# Patient Record
Sex: MALE | Race: WHITE | NOT HISPANIC OR LATINO | Employment: FULL TIME | URBAN - METROPOLITAN AREA
[De-identification: names, ages, dates, MRNs, and addresses within clinical notes are randomized per-mention and may not be internally consistent; named-entity substitution may affect disease eponyms.]

---

## 2019-01-07 ENCOUNTER — TELEPHONE (OUTPATIENT)
Dept: INTERNAL MEDICINE | Facility: CLINIC | Age: 54
End: 2019-01-07

## 2019-01-08 PROBLEM — M16.0 ARTHRITIS OF BOTH HIPS: Status: ACTIVE | Noted: 2018-01-22

## 2019-01-09 ENCOUNTER — OFFICE VISIT (OUTPATIENT)
Dept: INTERNAL MEDICINE | Facility: CLINIC | Age: 54
End: 2019-01-09

## 2019-01-09 VITALS
HEIGHT: 74 IN | HEART RATE: 68 BPM | DIASTOLIC BLOOD PRESSURE: 80 MMHG | SYSTOLIC BLOOD PRESSURE: 114 MMHG | WEIGHT: 205 LBS | BODY MASS INDEX: 26.31 KG/M2 | TEMPERATURE: 98.3 F

## 2019-01-09 DIAGNOSIS — J01.00 ACUTE NON-RECURRENT MAXILLARY SINUSITIS: Primary | ICD-10-CM

## 2019-01-09 PROCEDURE — 99213 OFFICE O/P EST LOW 20 MIN: CPT | Performed by: PHYSICIAN ASSISTANT

## 2019-01-09 RX ORDER — CLARITHROMYCIN 250 MG/1
500 TABLET, FILM COATED ORAL 2 TIMES DAILY
Qty: 20 TABLET | Refills: 0 | Status: SHIPPED | OUTPATIENT
Start: 2019-01-09 | End: 2019-01-23

## 2019-01-09 NOTE — PROGRESS NOTES
Patient Care Team:  Prashant Dixon MD as PCP - General (Internal Medicine)    Chief Complaint;:   Chief Complaint   Patient presents with   • Headache     symptoms for about 5 days   • Nasal Congestion        Subjective     HPI  6 day history of sinus pressure, congestion, headache, thick green drainage, slight mild nonproductive cough.   No fever or chills.  He has hx of allergies and frequent sinus infections in the past.   He is nonsmoker  Past Medical History:   Diagnosis Date   • Allergic rhinitis    • Arthritis    • Headache    • Insomnia        Social History     Socioeconomic History   • Marital status:      Spouse name: Not on file   • Number of children: Not on file   • Years of education: Not on file   • Highest education level: Not on file   Social Needs   • Financial resource strain: Not on file   • Food insecurity - worry: Not on file   • Food insecurity - inability: Not on file   • Transportation needs - medical: Not on file   • Transportation needs - non-medical: Not on file   Occupational History   • Not on file   Tobacco Use   • Smoking status: Never Smoker   • Smokeless tobacco: Never Used   Substance and Sexual Activity   • Alcohol use: Yes     Comment: weekly acohol use   • Drug use: Defer   • Sexual activity: Defer   Other Topics Concern   • Not on file   Social History Narrative   • Not on file       No Known Allergies    Review of Systems:     Review of Systems   Constitutional: Negative for chills, diaphoresis and fever.   HENT: Positive for ear pain, postnasal drip, rhinorrhea, sinus pressure and sore throat. Negative for congestion, ear discharge, hearing loss, mouth sores, nosebleeds, tinnitus and trouble swallowing.    Eyes: Negative for pain and visual disturbance.   Respiratory: Positive for cough. Negative for shortness of breath and wheezing.    Cardiovascular: Negative for chest pain, palpitations and leg swelling.   Gastrointestinal: Negative for abdominal pain, blood in  "stool, constipation, diarrhea, nausea, vomiting and GERD.   Genitourinary: Negative for breast discharge, dysuria, frequency and hematuria.   Musculoskeletal: Negative for arthralgias, back pain, joint swelling, myalgias, neck pain, neck stiffness and bursitis.   Skin: Negative for rash and skin lesions.   Allergic/Immunologic: Negative for environmental allergies.   Neurological: Negative for dizziness, seizures, syncope, speech difficulty, weakness, headache and memory problem.   Psychiatric/Behavioral: Negative for sleep disturbance and depressed mood. The patient is not nervous/anxious.        Vital Signs  Vitals:    01/09/19 0959   BP: 114/80   BP Location: Left arm   Patient Position: Sitting   Cuff Size: Adult   Pulse: 68   Temp: 98.3 °F (36.8 °C)   TempSrc: Temporal   Weight: 93 kg (205 lb)   Height: 188 cm (74\")   PainSc: 0-No pain         Current Outpatient Medications:   •  clarithromycin (BIAXIN) 250 MG tablet, Take 2 tablets by mouth 2 (Two) Times a Day., Disp: 20 tablet, Rfl: 0    Physical Exam:    Physical Exam   Constitutional: He is oriented to person, place, and time. He appears well-developed and well-nourished.   HENT:   Green post nasal drainage   Neck: Normal range of motion. Neck supple.   Cardiovascular: Normal rate, regular rhythm and normal heart sounds.   Pulmonary/Chest: Effort normal and breath sounds normal.   Lymphadenopathy:     He has cervical adenopathy.   Neurological: He is alert and oriented to person, place, and time.   Nursing note and vitals reviewed.        Assessment/Plan   Julio was seen today for headache and nasal congestion.    Diagnoses and all orders for this visit:    Acute non-recurrent maxillary sinusitis    Other orders  -     clarithromycin (BIAXIN) 250 MG tablet; Take 2 tablets by mouth 2 (Two) Times a Day.      Patient Instructions   Biaxin twice a day.  Call if not improving next week.      Plan of care reviewed with patient at the conclusion of today's visit. " Education was provided regarding diagnosis, management, and any prescribed or recommended OTC medications.Patient verbalizes understanding of and agreement with management plan.     Maggie Capone PA-C

## 2019-01-09 NOTE — PROGRESS NOTES
Patient Care Team:  Prashant Dixon MD as PCP - General (Internal Medicine)    Chief Complaint;:   Chief Complaint   Patient presents with   • Headache     symptoms for about 5 days   • Nasal Congestion        Subjective     HPI    Past Medical History:   Diagnosis Date   • Allergic rhinitis    • Arthritis    • Headache    • Insomnia        Social History     Socioeconomic History   • Marital status:      Spouse name: Not on file   • Number of children: Not on file   • Years of education: Not on file   • Highest education level: Not on file   Social Needs   • Financial resource strain: Not on file   • Food insecurity - worry: Not on file   • Food insecurity - inability: Not on file   • Transportation needs - medical: Not on file   • Transportation needs - non-medical: Not on file   Occupational History   • Not on file   Tobacco Use   • Smoking status: Never Smoker   • Smokeless tobacco: Never Used   Substance and Sexual Activity   • Alcohol use: Yes     Comment: weekly acohol use   • Drug use: Defer   • Sexual activity: Defer   Other Topics Concern   • Not on file   Social History Narrative   • Not on file       No Known Allergies    Review of Systems:     Review of Systems   Constitutional: Positive for fatigue. Negative for chills and fever.   HENT: Positive for congestion and sinus pressure. Negative for ear pain.    Respiratory: Positive for cough. Negative for chest tightness, shortness of breath and wheezing.    Cardiovascular: Negative for chest pain and palpitations.   Gastrointestinal: Negative for abdominal pain, blood in stool and constipation.   Skin: Negative for color change.   Allergic/Immunologic: Negative for environmental allergies.   Neurological: Negative for dizziness, speech difficulty and headache.   Psychiatric/Behavioral: Negative for decreased concentration. The patient is not nervous/anxious.        Vital Signs  Vitals:    01/09/19 0959   BP: 114/80   BP Location: Left arm   Patient  "Position: Sitting   Cuff Size: Adult   Pulse: 68   Temp: 98.3 °F (36.8 °C)   TempSrc: Temporal   Weight: 93 kg (205 lb)   Height: 188 cm (74\")   PainSc: 0-No pain       No current outpatient medications on file.    Physical Exam:    Physical Exam      Assessment/Plan   There are no diagnoses linked to this encounter.  There are no Patient Instructions on file for this visit.    Plan of care reviewed with patient at the conclusion of today's visit. Education was provided regarding diagnosis, management, and any prescribed or recommended OTC medications.Patient verbalizes understanding of and agreement with management plan.     Joanna Meyer MA  "

## 2019-01-18 ENCOUNTER — OFFICE VISIT (OUTPATIENT)
Dept: INTERNAL MEDICINE | Facility: CLINIC | Age: 54
End: 2019-01-18

## 2019-01-18 VITALS
TEMPERATURE: 98.2 F | BODY MASS INDEX: 25.67 KG/M2 | WEIGHT: 200 LBS | HEIGHT: 74 IN | HEART RATE: 60 BPM | SYSTOLIC BLOOD PRESSURE: 110 MMHG | DIASTOLIC BLOOD PRESSURE: 70 MMHG

## 2019-01-18 DIAGNOSIS — J01.00 ACUTE NON-RECURRENT MAXILLARY SINUSITIS: Primary | ICD-10-CM

## 2019-01-18 PROCEDURE — 99213 OFFICE O/P EST LOW 20 MIN: CPT | Performed by: PHYSICIAN ASSISTANT

## 2019-01-18 RX ORDER — FLUTICASONE PROPIONATE 50 MCG
1 SPRAY, SUSPENSION (ML) NASAL DAILY
COMMUNITY
Start: 2016-02-22 | End: 2020-03-12 | Stop reason: SDUPTHER

## 2019-01-18 RX ORDER — FEXOFENADINE HCL 180 MG/1
180 TABLET ORAL 2 TIMES DAILY
COMMUNITY
End: 2020-03-12 | Stop reason: SDUPTHER

## 2019-01-18 RX ORDER — TADALAFIL 5 MG/1
TABLET ORAL AS NEEDED
COMMUNITY
Start: 2012-02-09

## 2019-01-18 RX ORDER — CEFDINIR 300 MG/1
300 CAPSULE ORAL 2 TIMES DAILY
Qty: 20 CAPSULE | Refills: 0 | Status: SHIPPED | OUTPATIENT
Start: 2019-01-18 | End: 2019-09-10

## 2019-01-18 RX ORDER — LORATADINE 10 MG/1
10 TABLET ORAL DAILY
COMMUNITY
End: 2023-03-06

## 2019-01-18 RX ORDER — CETIRIZINE HYDROCHLORIDE 10 MG/1
10 TABLET ORAL DAILY
COMMUNITY
End: 2020-03-12 | Stop reason: SDUPTHER

## 2019-01-18 NOTE — PROGRESS NOTES
"Patient Care Team:  Prashant Dixon MD as PCP - General (Internal Medicine)    Chief Complaint;:   Chief Complaint   Patient presents with   • Follow-up     sinus infection not better        Subjective     HPI  53-year-old white male presents to the office today complaining of persistent sinus pain.  He completed the Biaxin with only minimal improvement.  He has had increased fatigue.  Slight cough.  Past Medical History:   Diagnosis Date   • Allergic rhinitis    • Arthritis    • Headache    • Insomnia        Social History     Socioeconomic History   • Marital status:      Spouse name: Not on file   • Number of children: Not on file   • Years of education: Not on file   • Highest education level: Not on file   Social Needs   • Financial resource strain: Not on file   • Food insecurity - worry: Not on file   • Food insecurity - inability: Not on file   • Transportation needs - medical: Not on file   • Transportation needs - non-medical: Not on file   Occupational History   • Not on file   Tobacco Use   • Smoking status: Never Smoker   • Smokeless tobacco: Never Used   Substance and Sexual Activity   • Alcohol use: Yes     Comment: weekly    • Drug use: No   • Sexual activity: Defer   Other Topics Concern   • Not on file   Social History Narrative   • Not on file       No Known Allergies    Review of Systems:     Review of Systems    Vital Signs  Vitals:    01/18/19 1617   BP: 110/70   BP Location: Left arm   Patient Position: Sitting   Cuff Size: Adult   Pulse: 60   Temp: 98.2 °F (36.8 °C)   TempSrc: Temporal   Weight: 90.7 kg (200 lb)   Height: 188 cm (74.02\")   PainSc:   6   PainLoc: Head  Comment: intermittent         Current Outpatient Medications:   •  cetirizine (zyrTEC) 10 MG tablet, Take 10 mg by mouth Daily., Disp: , Rfl:   •  fexofenadine (ALLEGRA) 180 MG tablet, Take 180 mg by mouth 2 (Two) Times a Day., Disp: , Rfl:   •  fluticasone (FLONASE) 50 MCG/ACT nasal spray, 1 spray into the nostril(s) as " directed by provider Daily. Each nostril, Disp: , Rfl:   •  loratadine (CLARITIN) 10 MG tablet, Take 10 mg by mouth Daily., Disp: , Rfl:   •  tadalafil (CIALIS) 5 MG tablet, Take  by mouth As Needed., Disp: , Rfl:   •  cefdinir (OMNICEF) 300 MG capsule, Take 1 capsule by mouth 2 (Two) Times a Day., Disp: 20 capsule, Rfl: 0  •  clarithromycin (BIAXIN) 250 MG tablet, Take 2 tablets by mouth 2 (Two) Times a Day., Disp: 20 tablet, Rfl: 0    Physical Exam:    Physical Exam    Procedures      Assessment/Plan   Problem List Items Addressed This Visit     None      Visit Diagnoses     Acute non-recurrent maxillary sinusitis    -  Primary    Cefdinir twice a day.    Relevant Medications    cefdinir (OMNICEF) 300 MG capsule        Patient Instructions   Cefdinir twice a day.   Continue sinus rinse.   Use Mucinex twice a day.      Plan of care reviewed with patient at the conclusion of today's visit. Education was provided regarding diagnosis, management, and any prescribed or recommended OTC medications.Patient verbalizes understanding of and agreement with management plan.     Maggie Capone PA-C

## 2019-01-18 NOTE — PROGRESS NOTES
Patient Care Team:  Prashant Dixon MD as PCP - General (Internal Medicine)    Chief Complaint;:   Chief Complaint   Patient presents with   • Follow-up     sinus infection not better        Subjective     HPI    Past Medical History:   Diagnosis Date   • Allergic rhinitis    • Arthritis    • Headache    • Insomnia        Past Surgical History:   Procedure Laterality Date   • KNEE ARTHROSCOPY Right    • ROTATOR CUFF REPAIR Left 2002       Family History   Problem Relation Age of Onset   • Alzheimer's disease Father    • Cancer Other    • Rheum arthritis Other    • Hypertension Other    • Osteoarthritis Other        Social History     Socioeconomic History   • Marital status:      Spouse name: Not on file   • Number of children: Not on file   • Years of education: Not on file   • Highest education level: Not on file   Social Needs   • Financial resource strain: Not on file   • Food insecurity - worry: Not on file   • Food insecurity - inability: Not on file   • Transportation needs - medical: Not on file   • Transportation needs - non-medical: Not on file   Occupational History   • Not on file   Tobacco Use   • Smoking status: Never Smoker   • Smokeless tobacco: Never Used   Substance and Sexual Activity   • Alcohol use: Yes     Comment: weekly    • Drug use: No   • Sexual activity: Defer   Other Topics Concern   • Not on file   Social History Narrative   • Not on file       No Known Allergies    Review of Systems:     Review of Systems   Constitutional: Negative for chills, fatigue and fever.   HENT: Positive for congestion and sinus pressure. Negative for ear pain.    Respiratory: Negative for cough, chest tightness, shortness of breath and wheezing.    Cardiovascular: Negative for chest pain and palpitations.   Gastrointestinal: Negative for abdominal pain, blood in stool and constipation.   Skin: Negative for color change.   Allergic/Immunologic: Negative for environmental allergies.   Neurological:  "Positive for headache. Negative for dizziness and speech difficulty.   Psychiatric/Behavioral: Negative for decreased concentration. The patient is not nervous/anxious.        Vital Signs  Vitals:    01/18/19 1617   BP: 110/70   BP Location: Left arm   Patient Position: Sitting   Cuff Size: Adult   Pulse: 60   Temp: 98.2 °F (36.8 °C)   TempSrc: Temporal   Weight: 90.7 kg (200 lb)   Height: 188 cm (74.02\")   PainSc:   6   PainLoc: Head  Comment: intermittent         Current Outpatient Medications:   •  cetirizine (zyrTEC) 10 MG tablet, Take 10 mg by mouth Daily., Disp: , Rfl:   •  fexofenadine (ALLEGRA) 180 MG tablet, Take 180 mg by mouth 2 (Two) Times a Day., Disp: , Rfl:   •  fluticasone (FLONASE) 50 MCG/ACT nasal spray, 1 spray into the nostril(s) as directed by provider Daily. Each nostril, Disp: , Rfl:   •  loratadine (CLARITIN) 10 MG tablet, Take 10 mg by mouth Daily., Disp: , Rfl:   •  tadalafil (CIALIS) 5 MG tablet, Take  by mouth As Needed., Disp: , Rfl:   •  clarithromycin (BIAXIN) 250 MG tablet, Take 2 tablets by mouth 2 (Two) Times a Day., Disp: 20 tablet, Rfl: 0    Physical Exam:    Physical Exam    Procedures     Results Review:    I reviewed the patient's new clinical results.    Assessment/Plan   Problem List Items Addressed This Visit     None        There are no Patient Instructions on file for this visit.    Plan of care reviewed with patient at the conclusion of today's visit. Education was provided regarding diagnosis, management, and any prescribed or recommended OTC medications.Patient verbalizes understanding of and agreement with management plan.     Maggie Monae MA  "

## 2019-01-18 NOTE — PROGRESS NOTES
"Patient Care Team:  Prashant Dixon MD as PCP - General (Internal Medicine)    Chief Complaint;:   Chief Complaint   Patient presents with   • Follow-up     sinus infection not better        Subjective     HPI    Past Medical History:   Diagnosis Date   • Allergic rhinitis    • Arthritis    • Headache    • Insomnia        Social History     Socioeconomic History   • Marital status:      Spouse name: Not on file   • Number of children: Not on file   • Years of education: Not on file   • Highest education level: Not on file   Social Needs   • Financial resource strain: Not on file   • Food insecurity - worry: Not on file   • Food insecurity - inability: Not on file   • Transportation needs - medical: Not on file   • Transportation needs - non-medical: Not on file   Occupational History   • Not on file   Tobacco Use   • Smoking status: Never Smoker   • Smokeless tobacco: Never Used   Substance and Sexual Activity   • Alcohol use: Yes     Comment: weekly    • Drug use: No   • Sexual activity: Defer   Other Topics Concern   • Not on file   Social History Narrative   • Not on file       No Known Allergies    Review of Systems:     Review of Systems    Vital Signs  Vitals:    01/18/19 1617   BP: 110/70   BP Location: Left arm   Patient Position: Sitting   Cuff Size: Adult   Pulse: 60   Temp: 98.2 °F (36.8 °C)   TempSrc: Temporal   Weight: 90.7 kg (200 lb)   Height: 188 cm (74.02\")   PainSc:   6   PainLoc: Head  Comment: intermittent         Current Outpatient Medications:   •  cetirizine (zyrTEC) 10 MG tablet, Take 10 mg by mouth Daily., Disp: , Rfl:   •  fexofenadine (ALLEGRA) 180 MG tablet, Take 180 mg by mouth 2 (Two) Times a Day., Disp: , Rfl:   •  fluticasone (FLONASE) 50 MCG/ACT nasal spray, 1 spray into the nostril(s) as directed by provider Daily. Each nostril, Disp: , Rfl:   •  loratadine (CLARITIN) 10 MG tablet, Take 10 mg by mouth Daily., Disp: , Rfl:   •  tadalafil (CIALIS) 5 MG tablet, Take  by mouth As " Needed., Disp: , Rfl:   •  cefdinir (OMNICEF) 300 MG capsule, Take 1 capsule by mouth 2 (Two) Times a Day., Disp: 20 capsule, Rfl: 0  •  clarithromycin (BIAXIN) 250 MG tablet, Take 2 tablets by mouth 2 (Two) Times a Day., Disp: 20 tablet, Rfl: 0    Physical Exam:    Physical Exam    Procedures      Assessment/Plan   Problem List Items Addressed This Visit     None      Visit Diagnoses     Acute non-recurrent maxillary sinusitis    -  Primary    Relevant Medications    cefdinir (OMNICEF) 300 MG capsule        Patient Instructions   Cefdinir twice a day.   Continue sinus rinse.   Use Mucinex twice a day.      Plan of care reviewed with patient at the conclusion of today's visit. Education was provided regarding diagnosis, management, and any prescribed or recommended OTC medications.Patient verbalizes understanding of and agreement with management plan.     Maggie Capone PA-C

## 2019-01-23 ENCOUNTER — OFFICE VISIT (OUTPATIENT)
Dept: INTERNAL MEDICINE | Facility: CLINIC | Age: 54
End: 2019-01-23

## 2019-01-23 VITALS
BODY MASS INDEX: 26.51 KG/M2 | SYSTOLIC BLOOD PRESSURE: 110 MMHG | WEIGHT: 200 LBS | TEMPERATURE: 97.9 F | HEIGHT: 73 IN | DIASTOLIC BLOOD PRESSURE: 72 MMHG | HEART RATE: 64 BPM

## 2019-01-23 DIAGNOSIS — J30.89 NON-SEASONAL ALLERGIC RHINITIS DUE TO OTHER ALLERGIC TRIGGER: Primary | ICD-10-CM

## 2019-01-23 DIAGNOSIS — M16.0 ARTHRITIS OF BOTH HIPS: ICD-10-CM

## 2019-01-23 DIAGNOSIS — R51.9 NONINTRACTABLE EPISODIC HEADACHE, UNSPECIFIED HEADACHE TYPE: ICD-10-CM

## 2019-01-23 DIAGNOSIS — F51.01 PRIMARY INSOMNIA: ICD-10-CM

## 2019-01-23 PROCEDURE — 99396 PREV VISIT EST AGE 40-64: CPT | Performed by: INTERNAL MEDICINE

## 2019-01-23 NOTE — PATIENT INSTRUCTIONS
Patient needs to return for fasting lab work  Patient needs to obtain any history from Brother with a reportedly elevated cardiac calcium score  Is to continue all medications including his antihistamines

## 2019-01-23 NOTE — PROGRESS NOTES
[facility]     Julio Pérez  1965   [id number]      Patient Care Team:  Prashant Dixon MD as PCP - General (Internal Medicine)    Chief Complaint::   Chief Complaint   Patient presents with   • Annual Exam     not fasting             HPI  The patient is a 53-year-old male presents for annual preventative visit and physical examination he overall feels well he has a recent sinusitis and congestion infection that is improved on antibiotic he was seen on December 18 with therapy of Omnicef he is improved.  The patient has general allergies has seen allergist and is on Zyrtec Allegra Flonase and Claritin and is improved and fairly well controlled    Chronic Conditions:  Chronic conditions are reviewed primarily allergy insomnia sinus headache and mild arthritis of both hips all currently stable.    Patient Active Problem List   Diagnosis   • Arthritis of both hips   • Headache   • Allergic rhinitis due to allergen   • Insomnia        Past Medical History:   Diagnosis Date   • Allergic rhinitis    • Arthritis    • Headache    • Insomnia        Past Surgical History:   Procedure Laterality Date   • KNEE ARTHROSCOPY Right    • ROTATOR CUFF REPAIR Left 2002       Family History   Problem Relation Age of Onset   • Alzheimer's disease Father    • Cancer Other    • Rheum arthritis Other    • Hypertension Other    • Osteoarthritis Other        Social History     Socioeconomic History   • Marital status:      Spouse name: Not on file   • Number of children: Not on file   • Years of education: Not on file   • Highest education level: Not on file   Social Needs   • Financial resource strain: Not on file   • Food insecurity - worry: Not on file   • Food insecurity - inability: Not on file   • Transportation needs - medical: Not on file   • Transportation needs - non-medical: Not on file   Occupational History   • Not on file   Tobacco Use   • Smoking status: Never Smoker   • Smokeless tobacco: Never Used  "  Substance and Sexual Activity   • Alcohol use: Yes     Comment: weekly    • Drug use: No   • Sexual activity: Defer   Other Topics Concern   • Not on file   Social History Narrative   • Not on file       No Known Allergies    Immunization History   Administered Date(s) Administered   • Tdap 09/16/2013        Health Maintenance Due   Topic Date Due   • ZOSTER VACCINE (1 of 2) 11/25/2017   • ANNUAL PHYSICAL  01/23/2019        Review of Systems     HEENT: Current resolving maxillary sinusitis with some headache denies ear problems no nosebleeds some  pharyngeal discomfort  NECK:  Denies pain stiffness or swelling or dysphagia  CHEST: Denies cough or wheeze shortness of breath rib chest wall injury  CARDIAC: Denies chest pain pressure tightness palpitations  ABD: Denies nausea vomiting recent colonoscopy-stable with repeat in 5 years colonoscopy  By EDILIA Barnes  : Denies dysuria or frequency  NEURO:  Denies syncope or paresthesias  PSYCH:  Normal  EXTREM: Some bilateral hip soreness has seen Dr. Lezama in the past NSAID therapy is beneficial.    Vital Signs  Vitals:    01/23/19 0853   BP: 110/72   BP Location: Left arm   Patient Position: Sitting   Cuff Size: Adult   Pulse: 64   Temp: 97.9 °F (36.6 °C)   TempSrc: Temporal   Weight: 90.7 kg (200 lb)   Height: 185 cm (72.84\")   PainSc: 0-No pain         Current Outpatient Medications:   •  cefdinir (OMNICEF) 300 MG capsule, Take 1 capsule by mouth 2 (Two) Times a Day., Disp: 20 capsule, Rfl: 0  •  cetirizine (zyrTEC) 10 MG tablet, Take 10 mg by mouth Daily., Disp: , Rfl:   •  fexofenadine (ALLEGRA) 180 MG tablet, Take 180 mg by mouth 2 (Two) Times a Day., Disp: , Rfl:   •  fluticasone (FLONASE) 50 MCG/ACT nasal spray, 1 spray into the nostril(s) as directed by provider Daily. Each nostril, Disp: , Rfl:   •  loratadine (CLARITIN) 10 MG tablet, Take 10 mg by mouth Daily., Disp: , Rfl:   •  tadalafil (CIALIS) 5 MG tablet, Take  by mouth As Needed., Disp: , Rfl: "     Physical Exam:  HEENT: Pupils equal reactive ENT clear no facial asymmetry pharynx is clear  NECK:  Without masses thyromegaly or bruit  Distention  CHEST: Clear to P&A without rales wheezes or rhonchi  CARDIAC: Regular rhythm without gallop rub click or murmur  ABD: Abdomen is normal  : Deferred  NEURO:  Intact  PSYCH:  Normal  EXTREM: Normal with minimal arthritis of both hips  Skin: Clear.  Warm and dry excellent peripheral pulses no evidence of neuropathy     Results Review:    Patient is not fasting this day will return for fasting CBC CMP lipid TSH PSA  Procedures     Patient Wellness Counseling:   Plan of care reviewed with patient at the conclusion of today's visit. Education was provided in regards to diagnosis, diet and exercise, prostate cancer screening discussed including benefit of early detection, potential need for follow-up, and harms associated with additional management. Patient agrees to screening.    Nutrition, physical activity, healthy weight,ways to reduce stress, adequate sleep, injury prevention, misuse of tobacco, alcohol and drugs, sexual behavior and STD's, dental health, mental health, and immunizations.    Plan of care reviewed with patient at the conclusion of today's visit. Education was provided regarding diagnosis, management and any prescribed or recommended OTC medications.  Patient verbalizes understanding of and agreement with management plan.      Medication Review: Medications reviewed and noted    Patient wellness counseling  Exercise: Patient is very active and exercises on a daily basis encouraged to continue same  Diet: Patient attempts healthy diet  Smoking: Nonsmoker  Alcohol: Evening alcohol no evidence of misuse  Screening: Preventative screening noted including colonoscopy completed September 2017    Assessment/Plan:  #1 chronic allergy-severe followed by allergy with control with Zyrtec Allegra Flonase and Claritin  #2 is acute x-ray sinusitis-improved  #3  osteoarthritis of both hips with arthro scopic surgery of right knee and left shoulder-stable  #4 insomnia  #5 family history of elevated cardiac calcium score, patient does not know full details Brother lives in [place] he will obtain the full details the patient is currently asymptomatic for any cardiovascular disease but await details of the family history for proceeding with any variety of CV evaluation pending patient's lab family history.    Patient Instructions   Patient needs to return for fasting lab work  Patient needs to obtain any history from Brother with a reportedly elevated cardiac calcium score  Is to continue all medications including his antihistamines         CMP:     HbA1c:  No results found for: HGBA1C  Microalbumin:  No results found for: MICROALBUR, POCMALB, POCCREAT  Lipid Panel  No results found for: CHOL, TRIG, HDL, LDL, AST, ALT     Counseling was given to  for the following topics: appropriate exercise .    Plan of care reviewed with patient at the conclusion of today's visit. Education was provided regarding diagnosis, management, and any prescribed or recommended OTC medications.Patient verbalizes understanding of and agreement with management plan.         Prashant Dixon MD

## 2019-02-05 RX ORDER — OSELTAMIVIR PHOSPHATE 75 MG/1
75 CAPSULE ORAL DAILY
Qty: 10 CAPSULE | Refills: 0 | Status: SHIPPED | OUTPATIENT
Start: 2019-02-05 | End: 2019-09-10

## 2019-02-21 ENCOUNTER — LAB (OUTPATIENT)
Dept: LAB | Facility: HOSPITAL | Age: 54
End: 2019-02-21

## 2019-02-21 DIAGNOSIS — R51.9 NONINTRACTABLE EPISODIC HEADACHE, UNSPECIFIED HEADACHE TYPE: ICD-10-CM

## 2019-02-21 DIAGNOSIS — M16.0 ARTHRITIS OF BOTH HIPS: ICD-10-CM

## 2019-02-21 DIAGNOSIS — J30.89 NON-SEASONAL ALLERGIC RHINITIS DUE TO OTHER ALLERGIC TRIGGER: ICD-10-CM

## 2019-02-21 LAB
ALBUMIN SERPL-MCNC: 4.26 G/DL (ref 3.2–4.8)
ALBUMIN/GLOB SERPL: 1.6 G/DL (ref 1.5–2.5)
ALP SERPL-CCNC: 67 U/L (ref 25–100)
ALT SERPL W P-5'-P-CCNC: 30 U/L (ref 7–40)
ANION GAP SERPL CALCULATED.3IONS-SCNC: 2 MMOL/L (ref 3–11)
ARTICHOKE IGE QN: 111 MG/DL (ref 0–130)
AST SERPL-CCNC: 36 U/L (ref 0–33)
BILIRUB SERPL-MCNC: 0.7 MG/DL (ref 0.3–1.2)
BUN BLD-MCNC: 15 MG/DL (ref 9–23)
BUN/CREAT SERPL: 13.4 (ref 7–25)
CALCIUM SPEC-SCNC: 9.5 MG/DL (ref 8.7–10.4)
CHLORIDE SERPL-SCNC: 104 MMOL/L (ref 99–109)
CHOLEST SERPL-MCNC: 187 MG/DL (ref 0–200)
CO2 SERPL-SCNC: 29 MMOL/L (ref 20–31)
CREAT BLD-MCNC: 1.12 MG/DL (ref 0.6–1.3)
DEPRECATED RDW RBC AUTO: 47.6 FL (ref 37–54)
ERYTHROCYTE [DISTWIDTH] IN BLOOD BY AUTOMATED COUNT: 12.8 % (ref 11.3–14.5)
GFR SERPL CREATININE-BSD FRML MDRD: 69 ML/MIN/1.73
GLOBULIN UR ELPH-MCNC: 2.6 GM/DL
GLUCOSE BLD-MCNC: 92 MG/DL (ref 70–100)
HCT VFR BLD AUTO: 43.3 % (ref 38.9–50.9)
HDLC SERPL-MCNC: 64 MG/DL (ref 40–60)
HGB BLD-MCNC: 14.1 G/DL (ref 13.1–17.5)
MCH RBC QN AUTO: 33.3 PG (ref 27–31)
MCHC RBC AUTO-ENTMCNC: 32.6 G/DL (ref 32–36)
MCV RBC AUTO: 102.4 FL (ref 80–99)
PLATELET # BLD AUTO: 301 10*3/MM3 (ref 150–450)
PMV BLD AUTO: 9.5 FL (ref 6–12)
POTASSIUM BLD-SCNC: 4.5 MMOL/L (ref 3.5–5.5)
PROT SERPL-MCNC: 6.9 G/DL (ref 5.7–8.2)
PSA SERPL-MCNC: 0.57 NG/ML (ref 0–4)
RBC # BLD AUTO: 4.23 10*6/MM3 (ref 4.2–5.76)
SODIUM BLD-SCNC: 135 MMOL/L (ref 132–146)
TRIGL SERPL-MCNC: 62 MG/DL (ref 0–150)
TSH SERPL DL<=0.05 MIU/L-ACNC: 2.12 MIU/ML (ref 0.35–5.35)
WBC NRBC COR # BLD: 5.96 10*3/MM3 (ref 3.5–10.8)

## 2019-02-21 PROCEDURE — 80061 LIPID PANEL: CPT

## 2019-02-21 PROCEDURE — 36415 COLL VENOUS BLD VENIPUNCTURE: CPT

## 2019-02-21 PROCEDURE — 85027 COMPLETE CBC AUTOMATED: CPT

## 2019-02-21 PROCEDURE — 84443 ASSAY THYROID STIM HORMONE: CPT

## 2019-02-21 PROCEDURE — G0103 PSA SCREENING: HCPCS

## 2019-02-21 PROCEDURE — 80053 COMPREHEN METABOLIC PANEL: CPT

## 2019-02-26 DIAGNOSIS — R79.89 ELEVATED LIVER FUNCTION TESTS: Primary | ICD-10-CM

## 2019-05-14 ENCOUNTER — TRANSCRIBE ORDERS (OUTPATIENT)
Dept: LAB | Facility: HOSPITAL | Age: 54
End: 2019-05-14

## 2019-05-14 ENCOUNTER — LAB (OUTPATIENT)
Dept: LAB | Facility: HOSPITAL | Age: 54
End: 2019-05-14

## 2019-05-14 DIAGNOSIS — L30.4 ERYTHEMA INTERTRIGO: ICD-10-CM

## 2019-05-14 DIAGNOSIS — L30.4 ERYTHEMA INTERTRIGO: Primary | ICD-10-CM

## 2019-05-14 LAB
ALBUMIN SERPL-MCNC: 4.2 G/DL (ref 3.5–5.2)
ALBUMIN/GLOB SERPL: 1.4 G/DL
ALP SERPL-CCNC: 50 U/L (ref 39–117)
ALT SERPL W P-5'-P-CCNC: 22 U/L (ref 1–41)
ANION GAP SERPL CALCULATED.3IONS-SCNC: 9.6 MMOL/L
AST SERPL-CCNC: 28 U/L (ref 1–40)
BASOPHILS # BLD AUTO: 0.07 10*3/MM3 (ref 0–0.2)
BASOPHILS NFR BLD AUTO: 1.2 % (ref 0–1.5)
BILIRUB SERPL-MCNC: 0.4 MG/DL (ref 0.2–1.2)
BUN BLD-MCNC: 16 MG/DL (ref 6–20)
BUN/CREAT SERPL: 14.2 (ref 7–25)
CALCIUM SPEC-SCNC: 9 MG/DL (ref 8.6–10.5)
CHLORIDE SERPL-SCNC: 105 MMOL/L (ref 98–107)
CO2 SERPL-SCNC: 24.4 MMOL/L (ref 22–29)
CREAT BLD-MCNC: 1.13 MG/DL (ref 0.76–1.27)
DEPRECATED RDW RBC AUTO: 47.8 FL (ref 37–54)
EOSINOPHIL # BLD AUTO: 0.13 10*3/MM3 (ref 0–0.4)
EOSINOPHIL NFR BLD AUTO: 2.3 % (ref 0.3–6.2)
ERYTHROCYTE [DISTWIDTH] IN BLOOD BY AUTOMATED COUNT: 12.4 % (ref 12.3–15.4)
GFR SERPL CREATININE-BSD FRML MDRD: 68 ML/MIN/1.73
GLOBULIN UR ELPH-MCNC: 2.9 GM/DL
GLUCOSE BLD-MCNC: 98 MG/DL (ref 65–99)
HCT VFR BLD AUTO: 44.8 % (ref 37.5–51)
HGB BLD-MCNC: 14.4 G/DL (ref 13–17.7)
IMM GRANULOCYTES # BLD AUTO: 0.01 10*3/MM3 (ref 0–0.05)
IMM GRANULOCYTES NFR BLD AUTO: 0.2 % (ref 0–0.5)
LYMPHOCYTES # BLD AUTO: 2.94 10*3/MM3 (ref 0.7–3.1)
LYMPHOCYTES NFR BLD AUTO: 51.2 % (ref 19.6–45.3)
MCH RBC QN AUTO: 33.5 PG (ref 26.6–33)
MCHC RBC AUTO-ENTMCNC: 32.1 G/DL (ref 31.5–35.7)
MCV RBC AUTO: 104.2 FL (ref 79–97)
MONOCYTES # BLD AUTO: 0.66 10*3/MM3 (ref 0.1–0.9)
MONOCYTES NFR BLD AUTO: 11.5 % (ref 5–12)
NEUTROPHILS # BLD AUTO: 1.93 10*3/MM3 (ref 1.7–7)
NEUTROPHILS NFR BLD AUTO: 33.6 % (ref 42.7–76)
NRBC BLD AUTO-RTO: 0 /100 WBC (ref 0–0.2)
PLATELET # BLD AUTO: 216 10*3/MM3 (ref 140–450)
PMV BLD AUTO: 10.5 FL (ref 6–12)
POTASSIUM BLD-SCNC: 4.6 MMOL/L (ref 3.5–5.2)
PROT SERPL-MCNC: 7.1 G/DL (ref 6–8.5)
RBC # BLD AUTO: 4.3 10*6/MM3 (ref 4.14–5.8)
SODIUM BLD-SCNC: 139 MMOL/L (ref 136–145)
TSH SERPL DL<=0.05 MIU/L-ACNC: 3.99 MIU/ML (ref 0.27–4.2)
WBC NRBC COR # BLD: 5.74 10*3/MM3 (ref 3.4–10.8)

## 2019-05-14 PROCEDURE — 84443 ASSAY THYROID STIM HORMONE: CPT

## 2019-05-14 PROCEDURE — 85025 COMPLETE CBC W/AUTO DIFF WBC: CPT

## 2019-05-14 PROCEDURE — 36415 COLL VENOUS BLD VENIPUNCTURE: CPT | Performed by: DERMATOLOGY

## 2019-05-14 PROCEDURE — 80053 COMPREHEN METABOLIC PANEL: CPT | Performed by: DERMATOLOGY

## 2019-06-04 ENCOUNTER — TRANSCRIBE ORDERS (OUTPATIENT)
Dept: LAB | Facility: HOSPITAL | Age: 54
End: 2019-06-04

## 2019-06-04 ENCOUNTER — LAB (OUTPATIENT)
Dept: LAB | Facility: HOSPITAL | Age: 54
End: 2019-06-04

## 2019-06-04 DIAGNOSIS — R79.89 ELEVATED LIVER FUNCTION TESTS: ICD-10-CM

## 2019-06-04 DIAGNOSIS — D75.89 MACROCYTOSIS: ICD-10-CM

## 2019-06-04 DIAGNOSIS — D75.89 MACROCYTOSIS: Primary | ICD-10-CM

## 2019-06-04 LAB
ALBUMIN SERPL-MCNC: 4.5 G/DL (ref 3.5–5.2)
ALBUMIN/GLOB SERPL: 1.6 G/DL
ALP SERPL-CCNC: 51 U/L (ref 39–117)
ALT SERPL W P-5'-P-CCNC: 21 U/L (ref 1–41)
ANION GAP SERPL CALCULATED.3IONS-SCNC: 12 MMOL/L
AST SERPL-CCNC: 27 U/L (ref 1–40)
BASOPHILS # BLD AUTO: 0.06 10*3/MM3 (ref 0–0.2)
BASOPHILS NFR BLD AUTO: 1.1 % (ref 0–1.5)
BILIRUB SERPL-MCNC: 0.5 MG/DL (ref 0.2–1.2)
BUN BLD-MCNC: 14 MG/DL (ref 6–20)
BUN/CREAT SERPL: 12 (ref 7–25)
CALCIUM SPEC-SCNC: 9.5 MG/DL (ref 8.6–10.5)
CHLORIDE SERPL-SCNC: 101 MMOL/L (ref 98–107)
CO2 SERPL-SCNC: 23 MMOL/L (ref 22–29)
CREAT BLD-MCNC: 1.17 MG/DL (ref 0.76–1.27)
DEPRECATED RDW RBC AUTO: 48.7 FL (ref 37–54)
EOSINOPHIL # BLD AUTO: 0.14 10*3/MM3 (ref 0–0.4)
EOSINOPHIL NFR BLD AUTO: 2.6 % (ref 0.3–6.2)
ERYTHROCYTE [DISTWIDTH] IN BLOOD BY AUTOMATED COUNT: 12.4 % (ref 12.3–15.4)
FOLATE SERPL-MCNC: 13.1 NG/ML (ref 4.78–24.2)
GFR SERPL CREATININE-BSD FRML MDRD: 66 ML/MIN/1.73
GLOBULIN UR ELPH-MCNC: 2.8 GM/DL
GLUCOSE BLD-MCNC: 85 MG/DL (ref 65–99)
HCT VFR BLD AUTO: 47 % (ref 37.5–51)
HGB BLD-MCNC: 15 G/DL (ref 13–17.7)
LYMPHOCYTES # BLD AUTO: 2.53 10*3/MM3 (ref 0.7–3.1)
LYMPHOCYTES NFR BLD AUTO: 46.5 % (ref 19.6–45.3)
MCH RBC QN AUTO: 33.7 PG (ref 26.6–33)
MCHC RBC AUTO-ENTMCNC: 31.9 G/DL (ref 31.5–35.7)
MCV RBC AUTO: 105.6 FL (ref 79–97)
MONOCYTES # BLD AUTO: 0.56 10*3/MM3 (ref 0.1–0.9)
MONOCYTES NFR BLD AUTO: 10.3 % (ref 5–12)
NEUTROPHILS # BLD AUTO: 2.15 10*3/MM3 (ref 1.7–7)
NEUTROPHILS NFR BLD AUTO: 39.5 % (ref 42.7–76)
PLAT MORPH BLD: NORMAL
PLATELET # BLD AUTO: 211 10*3/MM3 (ref 140–450)
PMV BLD AUTO: 10.5 FL (ref 6–12)
POTASSIUM BLD-SCNC: 4.7 MMOL/L (ref 3.5–5.2)
PROT SERPL-MCNC: 7.3 G/DL (ref 6–8.5)
RBC # BLD AUTO: 4.45 10*6/MM3 (ref 4.14–5.8)
RBC MORPH BLD: NORMAL
SODIUM BLD-SCNC: 136 MMOL/L (ref 136–145)
VIT B12 BLD-MCNC: 1283 PG/ML (ref 211–946)
WBC MORPH BLD: NORMAL
WBC NRBC COR # BLD: 5.44 10*3/MM3 (ref 3.4–10.8)

## 2019-06-04 PROCEDURE — 36415 COLL VENOUS BLD VENIPUNCTURE: CPT

## 2019-06-04 PROCEDURE — 80053 COMPREHEN METABOLIC PANEL: CPT

## 2019-06-04 PROCEDURE — 85007 BL SMEAR W/DIFF WBC COUNT: CPT

## 2019-06-04 PROCEDURE — 82607 VITAMIN B-12: CPT

## 2019-06-04 PROCEDURE — 82746 ASSAY OF FOLIC ACID SERUM: CPT

## 2019-06-04 PROCEDURE — 85025 COMPLETE CBC W/AUTO DIFF WBC: CPT

## 2019-09-10 ENCOUNTER — OFFICE VISIT (OUTPATIENT)
Dept: INTERNAL MEDICINE | Facility: CLINIC | Age: 54
End: 2019-09-10

## 2019-09-10 ENCOUNTER — TELEPHONE (OUTPATIENT)
Dept: INTERNAL MEDICINE | Facility: CLINIC | Age: 54
End: 2019-09-10

## 2019-09-10 VITALS
HEART RATE: 60 BPM | SYSTOLIC BLOOD PRESSURE: 100 MMHG | WEIGHT: 198 LBS | HEIGHT: 73 IN | BODY MASS INDEX: 26.24 KG/M2 | DIASTOLIC BLOOD PRESSURE: 70 MMHG

## 2019-09-10 DIAGNOSIS — J30.89 NON-SEASONAL ALLERGIC RHINITIS DUE TO OTHER ALLERGIC TRIGGER: ICD-10-CM
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
## 2019-09-10 PROBLEM — D22.9 SKIN MOLE: Status: ACTIVE | Noted: 2019-09-10

## 2019-09-10 PROBLEM — M19.90 OSTEOARTHRITIS: Status: ACTIVE | Noted: 2019-09-10

## 2019-09-10 PROBLEM — S40.861A: Status: ACTIVE | Noted: 2019-09-10

## 2019-09-10 PROCEDURE — 99213 OFFICE O/P EST LOW 20 MIN: CPT | Performed by: INTERNAL MEDICINE

## 2019-09-10 RX ORDER — PREDNISONE 20 MG/1
20 TABLET ORAL 2 TIMES DAILY
Qty: 6 TABLET | Refills: 1 | Status: SHIPPED | OUTPATIENT
Start: 2019-09-10 | End: 2020-01-24

## 2019-09-10 RX ORDER — DOXYCYCLINE HYCLATE 100 MG/1
100 TABLET, DELAYED RELEASE ORAL 2 TIMES DAILY
Qty: 16 TABLET | Refills: 0 | Status: SHIPPED | OUTPATIENT
Start: 2019-09-10 | End: 2020-01-24

## 2019-09-10 NOTE — PROGRESS NOTES
[facility]     Julio Pérez  1965   [id number]      Patient Care Team:  Prashant Dixon MD as PCP - General (Internal Medicine)    Chief Complaint::   Chief Complaint   Patient presents with   • Insect Bite     believes he got these Sunday afternoon.  Itching.  Multiple bites            HPI  Patient 53-year-old male who had several insect bites occurring on Sunday afternoon September 8 pruritic inflamed erythematous minor soreness denies fever chills he is not sure of the vector,.  History of chronic allergy on Flonase and antihistamines otherwise overall healthy.      Patient Active Problem List   Diagnosis   • Arthritis of both hips   • Headache   • Allergic rhinitis due to allergen   • Insomnia   • Skin mole   • Insect bite of right upper arm with local reaction   • Osteoarthritis   • Insect bites        Past Medical History:   Diagnosis Date   • Allergic rhinitis    • Arthritis    • Headache    • Insomnia        Past Surgical History:   Procedure Laterality Date   • KNEE ARTHROSCOPY Right    • ROTATOR CUFF REPAIR Left 2002       Family History   Problem Relation Age of Onset   • Alzheimer's disease Father    • Cancer Other         Breast   • Rheum arthritis Other    • Hypertension Other    • Osteoarthritis Other    • Alzheimer's disease Other        Social History     Socioeconomic History   • Marital status:      Spouse name: Not on file   • Number of children: Not on file   • Years of education: Not on file   • Highest education level: Not on file   Tobacco Use   • Smoking status: Never Smoker   • Smokeless tobacco: Never Used   Substance and Sexual Activity   • Alcohol use: Yes     Comment: weekly    • Drug use: No   • Sexual activity: Defer       No Known Allergies    Review of Systems     HEENT: Denies headache or dizzy has chronic allergy  NECK: Denies dysphasia  CHEST: Denies cough or wheeze  CARDIAC: Denies chest pain or pressure  ABD: Denies nausea vomiting  : Denies  "dysuria  NEURO: Denies syncope concussion  PSYCH: Denies anxiety depression  EXTREM: Complains of skin insect bites left and right leg mostly in upper thigh area and posterior    Vital Signs  Vitals:    09/10/19 0850   BP: 100/70   BP Location: Left arm   Patient Position: Sitting   Cuff Size: Adult   Pulse: 60   Weight: 89.8 kg (198 lb)   Height: 185 cm (72.84\")   PainSc: 0-No pain     Body mass index is 26.24 kg/m².    Current Outpatient Medications:   •  cetirizine (zyrTEC) 10 MG tablet, Take 10 mg by mouth Daily., Disp: , Rfl:   •  fexofenadine (ALLEGRA) 180 MG tablet, Take 180 mg by mouth 2 (Two) Times a Day., Disp: , Rfl:   •  fluticasone (FLONASE) 50 MCG/ACT nasal spray, 1 spray into the nostril(s) as directed by provider Daily. Each nostril, Disp: , Rfl:   •  loratadine (CLARITIN) 10 MG tablet, Take 10 mg by mouth Daily., Disp: , Rfl:   •  tadalafil (CIALIS) 5 MG tablet, Take  by mouth As Needed., Disp: , Rfl:   •  doxycycline (DORYX) 100 MG enteric coated tablet, Take 1 tablet by mouth 2 (Two) Times a Day., Disp: 16 tablet, Rfl: 0  •  predniSONE (DELTASONE) 20 MG tablet, Take 1 tablet by mouth 2 (Two) Times a Day., Disp: 6 tablet, Rfl: 1    Physical Exam     ACE III MINI         HEENT: Normal  NECK: No masses or bruit  CHEST: Clear  CARDIAC: Regular no murmur  ABD: Liver spleen normal  :   NEURO: CNS intact  PSYCH: Normal  EXTREM: Multiple skin insect bites with erythema minor swelling both legs mostly in the thigh posterior thigh area  Skin: Insect bites multiple     Results Review:    No results found for this or any previous visit (from the past 672 hour(s)).  Procedures    Medication Review: Medications reviewed and noted    Patient wellness counseling  Exercise:   Diet:   Smoking:  Alcohol:  Screening:    Assessment/Plan:    Problem List Items Addressed This Visit        Respiratory    Allergic rhinitis due to allergen    Current Assessment & Plan     Long history of chronic allergy Claritin Zyrtec " Allegra and Flonase are beneficial            Other    Insect bites - Primary    Overview     Patient had insect bites occurring Sunday, September 8 both lower legs and upper posterior thighs appear irritated erythematous and are pruritic no pain no fever chills do not look secondarily infected no adenopathy in the groin.         Current Assessment & Plan     Inflamed and pruritic insect bites of lower legs and upper posterior thighs we will treat with doxycycline 100 twice daily for 8 days and prednisone 20 mg twice daily for 3 days is to notify us of any change                Patient Instructions   Suggest cool water bathing of the insect bites  Doxycycline 100 mg twice daily for 8 days  Prednisone 20 mg twice daily for 3 days  Notify us if not improved  See regular appointment       Plan of care reviewed with patient at the conclusion of today's visit. Education was provided regarding diagnosis, management, and any prescribed or recommended OTC medications.Patient verbalizes understanding of and agreement with management plan.         Prashant Dixon MD      Note: Part of this note may be an electronic transcription/translation of spoken language to printed text using the Dragon Dictation system.

## 2019-09-10 NOTE — PATIENT INSTRUCTIONS
Suggest cool water bathing of the insect bites  Doxycycline 100 mg twice daily for 8 days  Prednisone 20 mg twice daily for 3 days  Notify us if not improved  See regular appointment

## 2019-09-10 NOTE — TELEPHONE ENCOUNTER
[facility] FROM PHARMACY CALLED STATING THEY HAVE A RX FOR DOXYCYCLINE 100 MG TABLETS AND THEY DON'T HAVE THEM  SHE SAID THEY HAVE DOXYCYCLINE HYCLATE 100 MG CAPSULES AND WANTS TO KNOW IF SHE CAN CHANGE IT TO THEM

## 2019-09-10 NOTE — ASSESSMENT & PLAN NOTE
Inflamed and pruritic insect bites of lower legs and upper posterior thighs we will treat with doxycycline 100 twice daily for 8 days and prednisone 20 mg twice daily for 3 days is to notify us of any change

## 2019-09-10 NOTE — TELEPHONE ENCOUNTER
Yes okay to change to doxycycline hyclate 100 mg capsule check with pharmacy about Strong I believe it might be just 1/day if so #8 one daily if it is twice a day 1 twice daily for 8 days.

## 2019-12-30 ENCOUNTER — TELEPHONE (OUTPATIENT)
Dept: INTERNAL MEDICINE | Facility: CLINIC | Age: 54
End: 2019-12-30

## 2019-12-30 RX ORDER — AMOXICILLIN AND CLAVULANATE POTASSIUM 500; 125 MG/1; MG/1
1 TABLET, FILM COATED ORAL 2 TIMES DAILY
Qty: 16 TABLET | Refills: 0 | OUTPATIENT
Start: 2019-12-30 | End: 2020-01-24

## 2019-12-30 NOTE — TELEPHONE ENCOUNTER
Patient stated he is out of town, in [place] with his family and has a sinus infection. Symptoms: does not have fever, bad headache, sore throat, congested, drainage. He would like to know if he can have an antibiotic sent to the pharmacy. He stated the last antibiotic that was prescribed for sinus infection helped. He would like to know if he could take that again. Please call patient and advise. Patient's call back number is [phone].     Please send to [id number] [address] [facility] - [phone]      Thank you.

## 2019-12-30 NOTE — TELEPHONE ENCOUNTER
Message noted suggest Augmentin 500 mg twice daily x8 days please notify patient and [facility].

## 2020-01-24 ENCOUNTER — OFFICE VISIT (OUTPATIENT)
Dept: INTERNAL MEDICINE | Facility: CLINIC | Age: 55
End: 2020-01-24

## 2020-01-24 VITALS
DIASTOLIC BLOOD PRESSURE: 80 MMHG | SYSTOLIC BLOOD PRESSURE: 100 MMHG | BODY MASS INDEX: 27.36 KG/M2 | WEIGHT: 202 LBS | HEART RATE: 68 BPM | HEIGHT: 72 IN

## 2020-01-24 DIAGNOSIS — Z12.5 SPECIAL SCREENING FOR MALIGNANT NEOPLASM OF PROSTATE: ICD-10-CM

## 2020-01-24 DIAGNOSIS — D75.89 MACROCYTOSIS WITHOUT ANEMIA: ICD-10-CM

## 2020-01-24 DIAGNOSIS — G56.22 ULNAR NEUROPATHY AT ELBOW OF LEFT UPPER EXTREMITY: ICD-10-CM

## 2020-01-24 DIAGNOSIS — J30.89 NON-SEASONAL ALLERGIC RHINITIS DUE TO OTHER ALLERGIC TRIGGER: Primary | ICD-10-CM

## 2020-01-24 DIAGNOSIS — M19.91 PRIMARY OSTEOARTHRITIS, UNSPECIFIED SITE: ICD-10-CM

## 2020-01-24 DIAGNOSIS — G47.33 OBSTRUCTIVE SLEEP APNEA: ICD-10-CM

## 2020-01-24 PROCEDURE — 99396 PREV VISIT EST AGE 40-64: CPT | Performed by: INTERNAL MEDICINE

## 2020-01-24 RX ORDER — LANOLIN ALCOHOL/MO/W.PET/CERES
1000 CREAM (GRAM) TOPICAL DAILY
COMMUNITY
End: 2020-03-12

## 2020-01-24 NOTE — ASSESSMENT & PLAN NOTE
Mild peripheral neuropathy left elbow ulnar groove with paresthesias left fifth and fourth finger related to position of left elbow.  Do not believe nerve conduction studies are warranted suggest padding left elbow resting on desk or armchair when working or reading.  Paresthesias are mild and do not believe related to carpal tunnel left hand.

## 2020-01-24 NOTE — PROGRESS NOTES
[facility]     Julio Pérez  1965   [id number]      Patient Care Team:  Prashant Dixon MD as PCP - General (Internal Medicine)    Chief Complaint::   Chief Complaint   Patient presents with   • Annual Exam     patient not fasting   • Shoulder Pain     left.  Sees Dr. 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  Previous surgical intervention in 2001 at  currently seeing Dr. Denton at the [facility] and receiving physical therapy discomfort is moderate and her motion is slightly reduced and strength is slightly reduced.  In addition the patient complains of some paresthesias left fifth and fourth finger, remote right knee arthroscopy with removal of extraneous tissue in the joint currently stable.  Patient had a recent diagnosis of macrocytosis and by hematology has been started on vitamin B12.  Addition the patient has a history of snoring/sleep apnea test and does have mild sleep apnea he is currently not on CPAP or BiPAP and other than snoring and some poor sleep not symptomatic.  Patient feels reasonably well continues exercise program eats a healthy cardiac diet is recent change in medication exercise or diet colonoscopy at age 52 by Dr. Barnes-seth with 1 polyp repeat in 5 years patient is a non-smoker have a living well does consume alcohol although it is been suggested that he reduce and stop his alcohol as a potential source of the macrocytosis.      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   • Ulnar neuropathy at elbow of left upper extremity   • Obstructive sleep  apnea        Past Medical History:   Diagnosis Date   • Allergic rhinitis    • Arthritis    • Headache    • Insomnia        Past Surgical History:   Procedure Laterality Date   • KNEE ARTHROSCOPY Right    • ROTATOR CUFF REPAIR Left 2002       Family History   Problem Relation Age of Onset   • Alzheimer's disease Father    • Cancer Other         Breast   • Rheum arthritis Other    • Hypertension Other    • Osteoarthritis Other    • Alzheimer's disease Other        Social History     Socioeconomic History   • Marital status:      Spouse name: Not on file   • Number of children: Not on file   • Years of education: Not on file   • Highest education level: Not on file   Tobacco Use   • Smoking status: Never Smoker   • Smokeless tobacco: Never Used   Substance and Sexual Activity   • Alcohol use: Yes     Comment: weekly    • Drug use: No   • Sexual activity: Defer       No Known Allergies    Immunization History   Administered Date(s) Administered   • Influenza, Unspecified 09/16/2013   • Tdap 09/16/2013        Health Maintenance Due   Topic Date Due   • ZOSTER VACCINE (1 of 2) 11/25/2017   • INFLUENZA VACCINE  08/01/2019        Review of Systems     HEENT: Denies headache or dizzy has chronic allergy denies hearing loss or vision changes  NECK: Denies pain stiffness swelling dysphasia reflux  CHEST: Non-smoker denies cough wheeze shortness of breath or rib injury or recent pulmonary infection  CARDIAC: Denies chest pain pressure tightness palpitations  ABD: Denies nausea vomiting abdominal pain colonoscopy 2 years ago with single polyp  : Has dysuria frequency  NEURO: Denies syncope concussion has left fourth and fifth finger paresthesias  PSYCH: Denies anxiety depression  EXTREM: Remote arthroscopic surgery right knee and left shoulder in 2000 and 2001 with current pain reduced range of motion no loss of strength in left shoulder currently being followed by Dr. Shankar receiving physical therapy.    Vital  "Signs  Vitals:    01/24/20 0857   BP: 100/80   BP Location: Left arm   Patient Position: Sitting   Cuff Size: Adult   Pulse: 68   Weight: 91.6 kg (202 lb)   Height: 182.9 cm (72\")   PainSc:   4   PainLoc: Shoulder     Body mass index is 27.4 kg/m².      Current Outpatient Medications:   •  cetirizine (zyrTEC) 10 MG tablet, Take 10 mg by mouth Daily., Disp: , Rfl:   •  fexofenadine (ALLEGRA) 180 MG tablet, Take 180 mg by mouth 2 (Two) Times a Day., Disp: , Rfl:   •  fluticasone (FLONASE) 50 MCG/ACT nasal spray, 1 spray into the nostril(s) as directed by provider Daily. Each nostril, Disp: , Rfl:   •  loratadine (CLARITIN) 10 MG tablet, Take 10 mg by mouth Daily., Disp: , Rfl:   •  tadalafil (CIALIS) 5 MG tablet, Take  by mouth As Needed., Disp: , Rfl:   •  vitamin B-12 (CYANOCOBALAMIN) 1000 MCG tablet, Take 1,000 mcg by mouth Daily., Disp: , Rfl:     Physical Exam   HEENT: No asymmetry pharynx is clear uvula is enlarged  NECK: No masses bruit thyromegaly or neck vein distention  CHEST: Clear without rales or wheezing  CARDIAC: Regular rhythm without gallop rub or click or murmur  ABD: Liver spleen normal positive bowel sounds and no bruits  : Deferred  NEURO: CNS is intact  PSYCH: No anxiety depression  EXTREM: Left shoulder probable incomplete rotator cuff tear-injury  Skin: Clear     Results Review:    Return to clinic for fasting lab  Procedures    Medication Review: Medications reviewed and noted    Patient wellness counseling  Exercise: Encouraged continued exercise and follow-up physical therapy left shoulder  Diet: Encouraged healthy cardiac diet  Smoking: Non-smoker  Alcohol: Encouraged DC ethanol as suggested by hematology oncology with regards to his macrocytosis  Screening: Return to clinic for fasting lab    Assessment/Plan:  Problem List Items Addressed This Visit        Respiratory    Allergic rhinitis due to allergen - Primary    Overview     History of chronic allergic rhinitis on Allegra 180, or " Zyrtec 10, or Claritin 10, and Flonase nasal spray daily.  Allergies under fair control with above medications no recent sinus infections but continues with the drainage PRN therapy.         Current Assessment & Plan     Continue with PRN therapy including the Flonase and antihistamines.         Obstructive sleep apnea    Overview     Patient has had 2 sleep apnea testing does have mild obstructive sleep apnea probably secondary to enlarged uvula does snore otherwise is asymptomatic not on therapy.         Current Assessment & Plan     Mild obstructive sleep apnea not on therapy            Nervous and Auditory    Ulnar neuropathy at elbow of left upper extremity    Overview     Patient has paresthesias left hand fifth and fourth finger with some subtle tenderness in the groove of the left elbow denies neck arthritis or pain does have some shoulder disease probably partial rotator cuff tear.         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  Do not believe nerve conduction studies are warranted suggest padding left elbow resting on desk or armchair when working or reading.  Paresthesias are mild and do not believe related to carpal tunnel left hand.            Musculoskeletal and Integument    Osteoarthritis    Overview     Remote right knee surgical removal of fragment by Dr. Julio River,  orthopedist 2000  Removed left shoulder arthroscopic surgery 2001  orthopedics  Currently seeing Dr. Denton at the [facility] with regards to left shoulder with probable rotator cuff disease comfort pain reduced range of motion and loss of strength.           Current Assessment & Plan     Right knee stable left shoulder discomfort being treated by Dr. Denton at the [facility] currently receiving physical therapy, is symptomatic with discomfort reduced range of motion and loss of strength  follow-up with Dr. Denton and continue physical therapy.            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  Suggest continue B12 5000 units over-the-counter daily and start folic acid 1 mg daily.  Lab pending         Relevant Medications    vitamin B-12 (CYANOCOBALAMIN) 1000 MCG tablet    Other Relevant Orders    CBC & Differential    Comprehensive Metabolic Panel    Lipid Panel    PSA Screen    TSH    Vitamin B12    Folate      Other Visit Diagnoses     Special screening for malignant neoplasm of prostate        Relevant Orders    PSA Screen           Patient Instructions   Return to clinic for fasting lab of CBC CMP lipid PSA TSH vitamin B12 folate  Continue follow-up with Dr. Denton and physical therapy for left shoulder  Continue healthy cardiac diet  Return visit in 6 months or as needed       CMP:  Lab Results   Component Value Date    BUN 14 06/04/2019    CREATININE 1.17 06/04/2019    EGFRIFNONA 66 06/04/2019    BCR 12.0 06/04/2019     06/04/2019    K 4.7 06/04/2019    CO2 23.0 06/04/2019    CALCIUM 9.5 06/04/2019    ALBUMIN 4.50 06/04/2019    BILITOT 0.5 06/04/2019    ALKPHOS 51 06/04/2019    AST 27 06/04/2019    ALT 21 06/04/2019     HbA1c:  No results found for: HGBA1C  Microalbumin:  No results found for: MICROALBUR, POCMALB, POCCREAT  Lipid Panel  Lab Results   Component Value Date    CHOL 187 02/21/2019    TRIG 62 02/21/2019    HDL 64 (H) 02/21/2019     02/21/2019    AST 27 06/04/2019    ALT 21 06/04/2019        Counseling was given to patient for the following topics: disease prevention.    Plan of care reviewed with  patient at the conclusion of today's visit. Education was provided regarding diagnosis, management, and any prescribed or recommended OTC medications.Patient verbalizes understanding of and agreement with management plan.         Prashant Dixon MD    Note: Part of this note may be an electronic transcription/translation of spoken language to printed text using Dragon Dictation System.

## 2020-01-24 NOTE — PATIENT INSTRUCTIONS
Return to clinic for fasting lab of CBC CMP lipid PSA TSH vitamin B12 folate  Continue follow-up with Dr. Denton and physical therapy for left shoulder  Continue healthy cardiac diet  Return visit in 6 months or as needed

## 2020-01-24 NOTE — ASSESSMENT & PLAN NOTE
History of mixed growth cytosis follow-up with the [facility] hematology, will obtain B12 level folic acid level and complete CBC.  Suggest continue B12 5000 units over-the-counter daily and start folic acid 1 mg daily.  Lab pending

## 2020-01-24 NOTE — ASSESSMENT & PLAN NOTE
Right knee stable left shoulder discomfort being treated by Dr. Denton at the [facility] currently receiving physical therapy, is symptomatic with discomfort reduced range of motion and loss of strength follow-up with Dr. Denton and continue physical therapy.

## 2020-03-12 ENCOUNTER — TELEPHONE (OUTPATIENT)
Dept: INTERNAL MEDICINE | Facility: CLINIC | Age: 55
End: 2020-03-12

## 2020-03-12 ENCOUNTER — OFFICE VISIT (OUTPATIENT)
Dept: INTERNAL MEDICINE | Facility: CLINIC | Age: 55
End: 2020-03-12

## 2020-03-12 VITALS
DIASTOLIC BLOOD PRESSURE: 70 MMHG | RESPIRATION RATE: 12 BRPM | HEART RATE: 70 BPM | TEMPERATURE: 99.4 F | SYSTOLIC BLOOD PRESSURE: 117 MMHG

## 2020-03-12 DIAGNOSIS — M79.10 MYALGIA: ICD-10-CM

## 2020-03-12 DIAGNOSIS — R50.9 FEVER, UNSPECIFIED FEVER CAUSE: Primary | ICD-10-CM

## 2020-03-12 DIAGNOSIS — R53.81 MALAISE: ICD-10-CM

## 2020-03-12 DIAGNOSIS — T73.2XXA FATIGUE DUE TO EXPOSURE, INITIAL ENCOUNTER: ICD-10-CM

## 2020-03-12 LAB
EXPIRATION DATE: NORMAL
FLUAV RNA RESP QL NAA+PROBE: NEGATIVE
FLUBV RNA RESP QL NAA+PROBE: NEGATIVE
INTERNAL CONTROL: NORMAL
Lab: NORMAL

## 2020-03-12 PROCEDURE — 99214 OFFICE O/P EST MOD 30 MIN: CPT | Performed by: INTERNAL MEDICINE

## 2020-03-12 PROCEDURE — 87502 INFLUENZA DNA AMP PROBE: CPT | Performed by: INTERNAL MEDICINE

## 2020-03-12 RX ORDER — FLUTICASONE PROPIONATE 50 MCG
1 SPRAY, SUSPENSION (ML) NASAL DAILY
Qty: 1 BOTTLE | Refills: 5
Start: 2020-03-12

## 2020-03-12 RX ORDER — FEXOFENADINE HCL 180 MG/1
180 TABLET ORAL DAILY
Qty: 90 TABLET | Refills: 1
Start: 2020-03-12 | End: 2023-03-06

## 2020-03-12 RX ORDER — CETIRIZINE HYDROCHLORIDE 10 MG/1
10 TABLET ORAL DAILY PRN
Qty: 90 TABLET | Refills: 1
Start: 2020-03-12 | End: 2023-03-06

## 2020-03-12 NOTE — TELEPHONE ENCOUNTER
Message noted if patient deteriorates and becomes short of breath and he needs to proceed to [facility] emergency room, if he is not in respiratory distress we will contact as soon as we find where we can obtain the coronavirus testing, in the meantime the patient needs to stay at home in a quarantined state for the next 4 days we will let the patient know when and ware the test is available.

## 2020-03-12 NOTE — TELEPHONE ENCOUNTER
Pt would like a call back, he would like to talk to some about symptoms  •cough.  •sore throat.  •runny or stuffy nose.  •muscle or body aches.  •headaches.  Body is not but have not taking temp. Also [place] international airports. I offered to make an appt he would like to talk to someone first. Please advise    Pt can be reached at [phone]

## 2020-03-12 NOTE — PROGRESS NOTES
Subjective   Julio Pérez is a 54 y.o. male. Complain of fever,severe myalgia,stiffness,arthralgia,malaise, fatigue after travel thru 2 major airports over last week.  Suddenly ill yesterday.     Patient was brought to the office by his wife.    History of Present Illness      Complain of fever,severe myalgia,stiffness,arthralgia,malaise, fatigue after travel thru 2 major airports over last week.  Suddenly ill yesterday. Severe Headache. No cough,shortness of breath,runny nose,earpain,wheeze,rashsore throat. Lives at home with rash. 22 college age children visited with in last week, Also saw his elderly mother 2 nites a go. No abdominal pain/diarrhea/nausea.    He did not have flu shot last Fall.      The following portions of the patient's history were reviewed and updated as appropriate: allergies, current medications, past family history, past medical history, past social history, past surgical history and problem list.    Review of Systems   Constitutional: Positive for chills, fatigue and fever.   HENT: Negative for congestion, ear pain, postnasal drip, sinus pressure, sinus pain and sore throat.    Respiratory: Negative for cough, chest tightness, shortness of breath and wheezing.    Cardiovascular: Negative for chest pain, palpitations and leg swelling.   Gastrointestinal: Negative for abdominal pain, blood in stool, constipation and diarrhea.   Skin: Negative for color change.   Allergic/Immunologic: Negative for environmental allergies.   Neurological: Negative for dizziness, speech difficulty and headaches.   Psychiatric/Behavioral: Negative for confusion. The patient is not nervous/anxious.        Objective   Physical Exam   Constitutional: He is oriented to person, place, and time. He appears well-developed and well-nourished. He appears ill.   HENT:   Head: Normocephalic.   Mouth/Throat: Uvula is midline and oropharynx is clear and moist.   Eyes: Pupils are equal, round, and reactive to light.  Conjunctivae and EOM are normal.   Neck: Normal range of motion. Neck supple. No thyromegaly present.   Cardiovascular: Normal rate, regular rhythm, normal heart sounds and intact distal pulses.   Pulmonary/Chest: Effort normal and breath sounds normal. He has no wheezes.   Musculoskeletal: Normal range of motion. He exhibits no edema.   Lymphadenopathy:     He has no cervical adenopathy.   Neurological: He is alert and oriented to person, place, and time.   Skin: Skin is warm and dry.   Psychiatric: He has a normal mood and affect. Thought content normal.   Nursing note and vitals reviewed.  /70 (BP Location: Right arm, Patient Position: Sitting, Cuff Size: Adult)   Pulse 70   Temp 99.4 °F (37.4 °C)   Resp 12   /70 (BP Location: Right arm, Patient Position: Sitting, Cuff Size: Adult)   Pulse 70   Temp 99.4 °F (37.4 °C)   Resp 12       Assessment/Plan   Problems Addressed this Visit     None      Visit Diagnoses     Fever, unspecified fever cause    -  Primary    flu swab neg.Covid-19 test ordered.Drink plenty of fluids,take Tylenol as needed for myalgia/malaise.Warm salt water gargles for sore throat.Saline nasal spray    Relevant Orders    POCT Flu A&B, Molecular (Completed)    CORONAVIRUS-19(COVID-19),RT-PCR,[facility] - Swab, Nasopharynx    Myalgia        See above    Relevant Orders    CORONAVIRUS-19(COVID-19),RT-PCR,[facility] - Swab, Nasopharynx    Fatigue due to exposure, initial encounter        See above    Relevant Orders    CORONAVIRUS-19(COVID-19),RT-PCR,[facility] - Swab, Nasopharynx    Malaise        See above    Relevant Orders    CORONAVIRUS-19(COVID-19),RT-PCR,[facility] - Swab, Nasopharynx      Covered 19 testing was deemed appropriate since this patient's symptoms are consistent and he tested negative for flu.  Per the new [employer] guidelines received today by email, respiratory virus panel was not needed.  Swabs for the respiratory virus panel are not available in our office or at  the lab next-door.  He was sent to [facility] for Covid-19 testing as instructed in [facility] protocol email.    Problem List Items Addressed This Visit     None      Visit Diagnoses     Fever, unspecified fever cause    -  Primary    flu swab neg.Covid-19 test ordered.Drink plenty of fluids,take Tylenol as needed for myalgia/malaise.Warm salt water gargles for sore throat.Saline nasal spray    Relevant Orders    POCT Flu A&B, Molecular (Completed)    CORONAVIRUS-19(COVID-19),RT-PCR,[facility] - Swab, Nasopharynx    Myalgia        See above    Relevant Orders    CORONAVIRUS-19(COVID-19),RT-PCR,[facility] - Swab, Nasopharynx    Fatigue due to exposure, initial encounter        See above    Relevant Orders    CORONAVIRUS-19(COVID-19),RT-PCR,[facility] - Swab, Nasopharynx    Malaise        See above    Relevant Orders    CORONAVIRUS-19(COVID-19),RT-PCR,[facility] - Swab, Nasopharynx        The patient and his wife were educated on Covid-19 and the need to self quarantine until the results of his coated 19 test are known.  They were given written instructions pertaining to this.  They were escorted out of the office by the back door next to the elevator.

## 2020-03-12 NOTE — TELEPHONE ENCOUNTER
Message noted sounds as if he possibly has the flu, believe the patient should come in and be seen.

## 2020-03-12 NOTE — TELEPHONE ENCOUNTER
See below.  I called patient.  His symptoms, listed below, started about 24 hours ago.  He's been home from [place] 8 days.  He was with his mother 2 days ago and is concerned about his exposure to her.  Says he slept 10 hours last night but still fatigued.  Please advise.

## 2020-03-12 NOTE — TELEPHONE ENCOUNTER
Pt's wife called.  [facility] stated they do not have the COVID 19 test.  [facility] is looking into where to send him...wife said he is feeling bad & they will go home to await our call.

## 2020-03-13 ENCOUNTER — TELEPHONE (OUTPATIENT)
Dept: INTERNAL MEDICINE | Facility: CLINIC | Age: 55
End: 2020-03-13

## 2020-03-13 ENCOUNTER — LAB (OUTPATIENT)
Dept: LAB | Facility: HOSPITAL | Age: 55
End: 2020-03-13

## 2020-03-13 DIAGNOSIS — M79.10 MYALGIA: ICD-10-CM

## 2020-03-13 DIAGNOSIS — R53.81 MALAISE: ICD-10-CM

## 2020-03-13 DIAGNOSIS — T73.2XXA FATIGUE DUE TO EXPOSURE, INITIAL ENCOUNTER: ICD-10-CM

## 2020-03-13 DIAGNOSIS — R50.9 FEVER, UNSPECIFIED FEVER CAUSE: ICD-10-CM

## 2020-03-13 PROCEDURE — 87635 SARS-COV-2 COVID-19 AMP PRB: CPT

## 2020-03-13 NOTE — TELEPHONE ENCOUNTER
Patient called wanting to know what he needs to do about being tested for COVID 19. Per JTH he does not need to be tested at this time. He is to follow direction that were given to him yesterday. He verbalized understanding.

## 2020-03-16 ENCOUNTER — TELEPHONE (OUTPATIENT)
Dept: INTERNAL MEDICINE | Facility: CLINIC | Age: 55
End: 2020-03-16

## 2020-03-16 NOTE — TELEPHONE ENCOUNTER
"I called patient.  He states he is feeling good; \"back to normal\".  I advised that lab testing will not be back until Wed or Thursday. (I called [facility] today and was told test was not received in the testing lab until 3/15/20 so results back in 3-4 days.    "

## 2020-03-18 LAB — SARS-COV-2 RNA RESP QL NAA+PROBE: NOT DETECTED

## 2020-08-17 ENCOUNTER — TELEPHONE (OUTPATIENT)
Dept: INTERNAL MEDICINE | Facility: CLINIC | Age: 55
End: 2020-08-17

## 2020-08-17 RX ORDER — AMOXICILLIN AND CLAVULANATE POTASSIUM 500; 125 MG/1; MG/1
1 TABLET, FILM COATED ORAL 2 TIMES DAILY
Qty: 14 TABLET | Refills: 0 | Status: SHIPPED | OUTPATIENT
Start: 2020-08-17 | End: 2020-08-24

## 2020-08-17 NOTE — TELEPHONE ENCOUNTER
Patient states that he wanted to see if he could get a prescription for antibiotics for what he thinks is a sinus infection.  He has congestion, sinus pressure, pain, mucous.  He can be reached at [phone]    [place]

## 2020-10-01 ENCOUNTER — TELEPHONE (OUTPATIENT)
Dept: INTERNAL MEDICINE | Facility: CLINIC | Age: 55
End: 2020-10-01

## 2020-10-01 NOTE — TELEPHONE ENCOUNTER
B12 PSA and lipid ordered for patient on 1/24/20  He no showed 19/14/20 appointment, was seen on 3/12/20  Can orders be canceled?

## 2021-03-29 ENCOUNTER — TELEPHONE (OUTPATIENT)
Dept: INTERNAL MEDICINE | Facility: CLINIC | Age: 56
End: 2021-03-29

## 2021-03-29 RX ORDER — CEFDINIR 300 MG/1
300 CAPSULE ORAL 2 TIMES DAILY
Qty: 16 CAPSULE | Refills: 0 | Status: SHIPPED | OUTPATIENT
Start: 2021-03-29

## 2021-08-23 ENCOUNTER — CLINICAL SUPPORT (OUTPATIENT)
Dept: INTERNAL MEDICINE | Facility: CLINIC | Age: 56
End: 2021-08-23

## 2021-08-23 DIAGNOSIS — L24.9 IRRITANT CONTACT DERMATITIS, UNSPECIFIED TRIGGER: Primary | ICD-10-CM

## 2021-08-23 DIAGNOSIS — S90.869A INSECT BITE OF FOOT, UNSPECIFIED LATERALITY, INITIAL ENCOUNTER: Primary | ICD-10-CM
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
## 2021-08-23 PROCEDURE — 96372 THER/PROPH/DIAG INJ SC/IM: CPT | Performed by: INTERNAL MEDICINE

## 2021-08-23 RX ORDER — TRIAMCINOLONE ACETONIDE 40 MG/ML
40 INJECTION, SUSPENSION INTRA-ARTICULAR; INTRAMUSCULAR ONCE
Status: COMPLETED | OUTPATIENT
Start: 2021-08-23 | End: 2021-08-23

## 2021-08-23 RX ADMIN — TRIAMCINOLONE ACETONIDE 40 MG: 40 INJECTION, SUSPENSION INTRA-ARTICULAR; INTRAMUSCULAR at 16:34

## 2023-01-25 ENCOUNTER — AMBULATORY SURGICAL CENTER (OUTPATIENT)
Dept: URBAN - METROPOLITAN AREA SURGERY 10 | Facility: SURGERY | Age: 58
End: 2023-01-25
Payer: COMMERCIAL

## 2023-01-25 ENCOUNTER — OFFICE (OUTPATIENT)
Dept: URBAN - METROPOLITAN AREA PATHOLOGY 4 | Facility: PATHOLOGY | Age: 58
End: 2023-01-25

## 2023-01-25 DIAGNOSIS — D12.5 BENIGN NEOPLASM OF SIGMOID COLON: ICD-10-CM

## 2023-01-25 DIAGNOSIS — Z86.010 PERSONAL HISTORY OF COLONIC POLYPS: ICD-10-CM

## 2023-01-25 DIAGNOSIS — D12.3 BENIGN NEOPLASM OF TRANSVERSE COLON: ICD-10-CM

## 2023-01-25 DIAGNOSIS — K64.1 SECOND DEGREE HEMORRHOIDS: ICD-10-CM

## 2023-01-25 DIAGNOSIS — Z12.11 ENCOUNTER FOR SCREENING FOR MALIGNANT NEOPLASM OF COLON: ICD-10-CM

## 2023-01-25 PROCEDURE — 88305 TISSUE EXAM BY PATHOLOGIST: CPT | Performed by: INTERNAL MEDICINE

## 2023-01-25 PROCEDURE — 45385 COLONOSCOPY W/LESION REMOVAL: CPT | Mod: 33 | Performed by: INTERNAL MEDICINE

## 2023-01-26 PROBLEM — Z12.11 SCREENING FOR COLONIC NEOPLASIA: Status: ACTIVE | Noted: 2023-01-26

## 2023-03-06 ENCOUNTER — TELEPHONE (OUTPATIENT)
Dept: INTERNAL MEDICINE | Facility: CLINIC | Age: 58
End: 2023-03-06

## 2023-03-06 ENCOUNTER — TELEPHONE (OUTPATIENT)
Dept: INTERNAL MEDICINE | Facility: CLINIC | Age: 58
End: 2023-03-06
Payer: COMMERCIAL

## 2023-03-06 RX ORDER — CEFDINIR 300 MG/1
300 CAPSULE ORAL 2 TIMES DAILY
Qty: 16 CAPSULE | Refills: 0 | Status: SHIPPED | OUTPATIENT
Start: 2023-03-06

## 2023-03-06 NOTE — TELEPHONE ENCOUNTER
"I spoke to patient.  He's living in [place] \"most of the year\" now.  C/o probable sinus infection.  States the pollen is \"really bad\" down there and he's miserable.  C/o cough - productive with color, throat tight, fatigue, itchy watery eyes, runny nose.  Symptoms started 4 days ago but getting worse.      Also, states he's had hives in the past and has seen dermatology.  His current allergy regimen is Xyzal and two OTC pepcid Q am, and Flonase.  He's previously used zyrtec, Allegra, and/or Claritin. He developed hives last night.  He's wondering if the Xyzal is the best therapy for him.      Please advise and if sending in meds, they should go to [facility] in [place], [place] (loaded in chart).   "

## 2023-03-06 NOTE — TELEPHONE ENCOUNTER
PT RETURN CALL AND I READ THE MESSAGE TO HIM.  PT HASN'T TAKEN A COVID TEST.    PT WILL TAKE A COVID TEST AND LET US KNOW OF THE RESULTS.  PT VERBALIZED AND UNDERSTOOD.

## 2023-03-06 NOTE — TELEPHONE ENCOUNTER
Noted patient is tested COVID-negative, will send cefdinir 300 twice daily for 8 days and continue chronic allergy medicine

## 2023-03-06 NOTE — TELEPHONE ENCOUNTER
Caller: Julio Pérez    Relationship: Self    Best call back number: [phone]    What is the best time to reach you: ASAP    Who are you requesting to speak with (clinical staff, provider,  specific staff member): SUNNY      What was the call regarding: PATIENT CALLED TO INFORM SUNNY TAKE HE TOOK 2 HOME COVID TEST AND THEY BOTH WERE NEGATIVE. HE WANTS TO KNOW IF IT IS OK FOR MEDICATION TO BE CALLED IN. (ORIGINAL ENCOUNTER HAS BEEN SIGNED)     Do you require a callback: YES    [facility] - [place], [place] - [address] [place] [address] [phone] [facility] [phone] FX

## 2023-03-06 NOTE — TELEPHONE ENCOUNTER
I left a voice mail for patient asking if he's taken a covid test.  If so, please advise us of results.  If not, please take a covid test.

## 2023-03-06 NOTE — TELEPHONE ENCOUNTER
PATIENT REQUESTING A CALL BACK. HE STATES HE IS OUT OF STATE AND REQUESTING A RX BE CALLED IN. HE WAS TOLD OUR PROVIDERS CAN'T TREAT OUT OF STATE. HIS SYMPTOMS ARE COUGH, CONGESTION, RUNNY NOSE/MUCUS, WATERY EYES AND SORE THROAT. PLEASE CALL TO ADVISE.

## 2024-01-09 ENCOUNTER — TELEPHONE (OUTPATIENT)
Dept: INTERNAL MEDICINE | Facility: CLINIC | Age: 59
End: 2024-01-09
Payer: COMMERCIAL

## 2024-01-09 RX ORDER — CETIRIZINE HYDROCHLORIDE, PSEUDOEPHEDRINE HYDROCHLORIDE 5; 120 MG/1; MG/1
1 TABLET, FILM COATED, EXTENDED RELEASE ORAL DAILY
Qty: 10 TABLET | Refills: 1 | Status: SHIPPED | OUTPATIENT
Start: 2024-01-09

## 2024-01-09 RX ORDER — CEFDINIR 300 MG/1
300 CAPSULE ORAL 2 TIMES DAILY
Qty: 16 CAPSULE | Refills: 1 | Status: SHIPPED | OUTPATIENT
Start: 2024-01-09

## 2024-01-09 NOTE — TELEPHONE ENCOUNTER
Caller: Julio Pérez    Relationship: Self    Best call back number: [phone]    What medication are you requesting: PROVIDER RECOMMENDATION     What are your current symptoms: HEADACHE, SINUS PRESSURE, STUFFY NOSE, YELLOW MUCUS     How long have you been experiencing symptoms: 1 WEEK     Have you had these symptoms before:    [x] Yes  [] No    If a prescription is needed, what is your preferred pharmacy and phone number: [facility]. [place], [place] - [facility] [address] .  [phone] [facility] [phone] FX     Additional notes: PATIENT IS REQUESTING TO HAVE SUNNY REVIEW THE MESSAGE AND ADVISE HIM ON A PRESCRIPTION OR IF HE NEEDS TO BE SEEN.     PLEASE CALL PATIENT BACK WITH AN UPDATE, IF NO ANSWER LEAVE A DETAILED MESSAGE.

## 2024-01-09 NOTE — TELEPHONE ENCOUNTER
Please check with patient about complete the symptoms and suggest that he get the home COVID test and call us and we can treat after we know that result please inform

## 2024-01-09 NOTE — TELEPHONE ENCOUNTER
PT CALLED AND STATED HE TOOK A COVID TEST AT HOME AND IT WAS NEGATIVE.    PLEASE CALL THE PT BACK [phone]

## 2024-01-09 NOTE — TELEPHONE ENCOUNTER
Omnicef 300 mg twice daily for 8 days is sent and Zyrtec-D once daily is sent to [facility] pharmacy please inform

## 2024-01-18 ENCOUNTER — TELEPHONE (OUTPATIENT)
Dept: INTERNAL MEDICINE | Facility: CLINIC | Age: 59
End: 2024-01-18
Payer: COMMERCIAL

## 2024-01-18 RX ORDER — LEVOFLOXACIN 500 MG/1
500 TABLET, FILM COATED ORAL DAILY
Qty: 8 TABLET | Refills: 0 | Status: SHIPPED | OUTPATIENT
Start: 2024-01-18

## 2024-01-18 NOTE — TELEPHONE ENCOUNTER
Please call patient with regards to his current respiratory infection and ask about COVID testing and symptoms believe he is out of town

## 2024-01-18 NOTE — TELEPHONE ENCOUNTER
I spoke with patient.  He completed the cefdinir yesterday.  States his drainage still has color, still with head congestion, HA, tenderness around his eyes, and decreased taste.  He did covid test negative. Wonders if needs a differen abx.  He's in [place] so send RX to [facility].

## 2024-01-18 NOTE — TELEPHONE ENCOUNTER
PATIENT HAS CALLED AND STATED HE WAS PRESCRIBED AN ANTIBIOTIC LAST WEEK FOR SINUS ISSUES. PATIENT STATES HE FEELS BETTER BUT STILL IS HAVING A LOT OF PRESSURE ALONG WITH DRAINAGE AND CONGESTION. PATIENT IS REQUESTING A CALL BACK FROM THE NURSE TO DISCUSS.    CALL BACK NUMBER [phone]

## 2024-01-18 NOTE — TELEPHONE ENCOUNTER
Per , he sent in [facility].   Patient notified.  Verbalized understanding.

## 2024-04-17 ENCOUNTER — TELEPHONE (OUTPATIENT)
Dept: INTERNAL MEDICINE | Facility: CLINIC | Age: 59
End: 2024-04-17

## 2024-04-17 RX ORDER — AMOXICILLIN AND CLAVULANATE POTASSIUM 500; 125 MG/1; MG/1
1 TABLET, FILM COATED ORAL 2 TIMES DAILY
Qty: 16 TABLET | Refills: 1 | Status: SHIPPED | OUTPATIENT
Start: 2024-04-17

## 2024-04-17 RX ORDER — FEXOFENADINE HCL AND PSEUDOEPHEDRINE HCI 60; 120 MG/1; MG/1
1 TABLET, EXTENDED RELEASE ORAL DAILY
Qty: 15 TABLET | Refills: 0 | Status: SHIPPED | OUTPATIENT
Start: 2024-04-17

## 2024-04-17 NOTE — TELEPHONE ENCOUNTER
Caller: Julio Pérez    Relationship: Self    Best call back number: [phone]    What medication are you requesting: SOMETHING FOR A SINUS INFECTION     What are your current symptoms: CONGESTION STUFFY HEAD LIGHT HEADED TENDER SINUS AREA     How long have you been experiencing symptoms: ABOUT TWO DAYS     Have you had these symptoms before:    [] Yes  [x] No    Have you been treated for these symptoms before:   [] Yes  [x] No    If a prescription is needed, what is your preferred pharmacy and phone number: [facility] [address] [facility] [phone] [facility] [phone] FX     Additional notes: THE PATIENT STATES THAT HE DID TAKE A COVID -19 TEST THAT WAS NEGATIVE THE PATIENT IS TRAVELING AND WOULD LIKE TO HAVE A MEDICATION CALLED IN